# Patient Record
Sex: FEMALE | Race: ASIAN | Employment: UNEMPLOYED | URBAN - METROPOLITAN AREA
[De-identification: names, ages, dates, MRNs, and addresses within clinical notes are randomized per-mention and may not be internally consistent; named-entity substitution may affect disease eponyms.]

---

## 2023-07-25 ENCOUNTER — HOSPITAL ENCOUNTER (OUTPATIENT)
Dept: ULTRASOUND IMAGING | Facility: HOSPITAL | Age: 53
Discharge: HOME | End: 2023-07-25
Attending: ADVANCED PRACTICE MIDWIFE
Payer: MEDICAID

## 2023-07-25 ENCOUNTER — LAB ENCOUNTER (OUTPATIENT)
Dept: LAB | Facility: HOSPITAL | Age: 53
End: 2023-07-25
Attending: ADVANCED PRACTICE MIDWIFE
Payer: MEDICAID

## 2023-07-25 ENCOUNTER — OFFICE VISIT (OUTPATIENT)
Dept: OBGYN CLINIC | Facility: CLINIC | Age: 53
End: 2023-07-25

## 2023-07-25 VITALS
SYSTOLIC BLOOD PRESSURE: 131 MMHG | WEIGHT: 152 LBS | DIASTOLIC BLOOD PRESSURE: 86 MMHG | HEIGHT: 59 IN | HEART RATE: 92 BPM | BODY MASS INDEX: 30.64 KG/M2

## 2023-07-25 DIAGNOSIS — N91.2 AMENORRHEA: ICD-10-CM

## 2023-07-25 DIAGNOSIS — N91.2 AMENORRHEA: Primary | ICD-10-CM

## 2023-07-25 DIAGNOSIS — R23.2 HOT FLASHES: ICD-10-CM

## 2023-07-25 PROBLEM — Z98.51 HISTORY OF BILATERAL TUBAL LIGATION: Status: ACTIVE | Noted: 2023-07-25

## 2023-07-25 LAB
ESTRADIOL SERPL-MCNC: 30.6 PG/ML
FSH SERPL-ACNC: 60.4 MIU/ML
TSI SER-ACNC: 0.95 MIU/ML (ref 0.36–3.74)

## 2023-07-25 PROCEDURE — 76856 US EXAM PELVIC COMPLETE: CPT | Performed by: ADVANCED PRACTICE MIDWIFE

## 2023-07-25 PROCEDURE — 82670 ASSAY OF TOTAL ESTRADIOL: CPT

## 2023-07-25 PROCEDURE — 99203 OFFICE O/P NEW LOW 30 MIN: CPT | Performed by: ADVANCED PRACTICE MIDWIFE

## 2023-07-25 PROCEDURE — 84443 ASSAY THYROID STIM HORMONE: CPT

## 2023-07-25 PROCEDURE — 83001 ASSAY OF GONADOTROPIN (FSH): CPT

## 2023-07-25 PROCEDURE — 36415 COLL VENOUS BLD VENIPUNCTURE: CPT

## 2023-07-25 PROCEDURE — 76830 TRANSVAGINAL US NON-OB: CPT | Performed by: ADVANCED PRACTICE MIDWIFE

## 2023-07-25 PROCEDURE — 3075F SYST BP GE 130 - 139MM HG: CPT | Performed by: ADVANCED PRACTICE MIDWIFE

## 2023-07-25 PROCEDURE — 3008F BODY MASS INDEX DOCD: CPT | Performed by: ADVANCED PRACTICE MIDWIFE

## 2023-07-25 PROCEDURE — 3079F DIAST BP 80-89 MM HG: CPT | Performed by: ADVANCED PRACTICE MIDWIFE

## 2023-08-06 DIAGNOSIS — N83.9 LESION OF LEFT OVARY: Primary | ICD-10-CM

## 2023-08-07 ENCOUNTER — TELEPHONE (OUTPATIENT)
Dept: OBGYN CLINIC | Facility: CLINIC | Age: 53
End: 2023-08-07

## 2023-08-07 NOTE — TELEPHONE ENCOUNTER
: Matthew     Patient given [facility] results and recommendations via . Patient verbalizes understanding.

## 2023-08-07 NOTE — TELEPHONE ENCOUNTER
----- Message from Mary Rg CNM sent at 8/6/2023  6:54 AM CDT -----  Labs c/w likely perimenopause/menopause. Ultrasound with 2 small fibroids. They also saw a bright spot on left ovary of uncertain cause. Recommend f/u ultrasound in 6 months to re-evaluate.  She should keep appt for annual exam. Thanks MAIK

## 2023-08-07 NOTE — TELEPHONE ENCOUNTER
----- Message from Aakash Nathan CNM sent at 8/6/2023  6:54 AM CDT -----  Labs c/w likely perimenopause/menopause. Ultrasound with 2 small fibroids. They also saw a bright spot on left ovary of uncertain cause. Recommend f/u ultrasound in 6 months to re-evaluate.  She should keep appt for annual exam. Thanks MAIK

## 2023-08-07 NOTE — PROGRESS NOTES
Alisson Ng is a 48year old female. HPI:       Alisson Ng is a 48year old female. [id number] Patient's last menstrual period was 2023 (approximate). Patient presents with: Annual: Pt is here for her annual visit and c/o not having her menstrual cycle and in concern   LP:     Visit done today with  line: [id number]    Denies abnormal discharge or vaginal irritation. Last period 23. Monogamous relationship. Feels safe. Denies abuse      Last pap: Never    Mammogram: never    Colonoscopy: No    Family hx of breast or ovarian CA: No      Had pelvic ultrasound for pain and overall WNL however showed a nonspecific ehcogenic focus in left ovary with recommendation to f/u in 6 months. HISTORY:  History reviewed. No pertinent past medical history. Past Surgical History:   Procedure Laterality Date    TUBAL LIGATION        History reviewed. No pertinent family history. Social History:   Social History     Socioeconomic History    Marital status: Single   Tobacco Use    Smoking status: Never    Smokeless tobacco: Never   Vaping Use    Vaping Use: Never used   Substance and Sexual Activity    Alcohol use: Never    Drug use: Never    Sexual activity: Yes      OB History     T4    L5    SAB0  IAB0  Ectopic0  Multiple1  Live Births5     Medications (Active prior to today's visit):  No current outpatient medications on file. Allergies:  No Known Allergies      ROS:     Review of Systems   Constitutional: Negative. Respiratory: Negative. Cardiovascular: Negative. Gastrointestinal: Negative. Genitourinary: Negative. Neurological: Negative. Psychiatric/Behavioral: Negative. PHYSICAL EXAM:      23  1213   BP: 116/81   Pulse: 103     Physical Exam  Vitals reviewed. Constitutional:       General: She is not in acute distress. Appearance: Normal appearance. She is well-developed. She is not ill-appearing.    HENT:      Head: Normocephalic. Neck:      Thyroid: No thyroid mass, thyromegaly or thyroid tenderness. Cardiovascular:      Rate and Rhythm: Normal rate and regular rhythm. Heart sounds: Normal heart sounds. Pulmonary:      Effort: Pulmonary effort is normal.      Breath sounds: Normal breath sounds. Chest:   Breasts:     Breasts are symmetrical.      Right: No swelling, bleeding, inverted nipple, mass, nipple discharge, skin change or tenderness. Left: No swelling, bleeding, inverted nipple, mass, nipple discharge, skin change or tenderness. Abdominal:      Palpations: Abdomen is soft. Tenderness: There is no abdominal tenderness. There is no right CVA tenderness or left CVA tenderness. Genitourinary:     General: Normal vulva. Pubic Area: No rash or pubic lice. Labia:         Right: No rash, tenderness, lesion or injury. Left: No rash, tenderness, lesion or injury. Urethra: No prolapse, urethral pain, urethral swelling or urethral lesion. Vagina: No signs of injury and foreign body. No vaginal discharge, erythema, tenderness, bleeding, lesions or prolapsed vaginal walls. Cervix: No cervical motion tenderness, discharge, friability, lesion, erythema, cervical bleeding or eversion. Musculoskeletal:         General: Normal range of motion. Lymphadenopathy:      Lower Body: No right inguinal adenopathy. No left inguinal adenopathy. Neurological:      Mental Status: She is alert and oriented to person, place, and time. Psychiatric:         Speech: Speech normal.         Behavior: Behavior normal.         Thought Content: Thought content normal.         Judgment: Judgment normal.          ASSESSMENT/PLAN:      Diagnoses and all orders for this visit:    Women's annual routine gynecological examination    Papanicolaou smear for cervical cancer screening    Screening mammogram for breast cancer  -     [facility] SCREENING BILBRITT (CPT=77067);  Future    Diabetes mellitus screening  -     HEMOGLOBIN A1C; Future    Screening cholesterol level  -     LIPID PANEL; Future    Screening for deficiency anemia  -     CBC, PLATELET; NO DIFFERENTIAL; Future    Colon cancer screening  -     OP REFERRAL TO [facility] GI TELEPHONE COLON SCREEN; Future    Screening for cervical cancer  -     THINPREP PAP SMEAR B; Future  -     HPV HIGH RISK , THIN PREP COLLECTION; Future        Normal gyne exam. Pap with HPV to lab      STD testing: Declines    Discussed breast awareness. Mammogram ordered       Referral to GI for colon cancer screening    List of PCP's given. Encouraged to see PCP    Discussed lab & ultrasound results. F/u ultrasound in 6 months. Advised menopausal after 1 yr no menses.      F/u 1 year or prn              8/7/2023  Mary Rg CNM

## 2023-08-08 ENCOUNTER — OFFICE VISIT (OUTPATIENT)
Dept: OBGYN CLINIC | Facility: CLINIC | Age: 53
End: 2023-08-08

## 2023-08-08 VITALS
WEIGHT: 153 LBS | HEART RATE: 103 BPM | DIASTOLIC BLOOD PRESSURE: 81 MMHG | HEIGHT: 59 IN | BODY MASS INDEX: 30.84 KG/M2 | SYSTOLIC BLOOD PRESSURE: 116 MMHG

## 2023-08-08 DIAGNOSIS — Z12.11 COLON CANCER SCREENING: ICD-10-CM

## 2023-08-08 DIAGNOSIS — Z12.31 SCREENING MAMMOGRAM FOR BREAST CANCER: ICD-10-CM

## 2023-08-08 DIAGNOSIS — Z13.220 SCREENING CHOLESTEROL LEVEL: ICD-10-CM

## 2023-08-08 DIAGNOSIS — Z13.0 SCREENING FOR DEFICIENCY ANEMIA: ICD-10-CM

## 2023-08-08 DIAGNOSIS — Z01.419 WOMEN'S ANNUAL ROUTINE GYNECOLOGICAL EXAMINATION: Primary | ICD-10-CM

## 2023-08-08 DIAGNOSIS — Z12.4 SCREENING FOR CERVICAL CANCER: ICD-10-CM

## 2023-08-08 DIAGNOSIS — Z12.4 PAPANICOLAOU SMEAR FOR CERVICAL CANCER SCREENING: ICD-10-CM

## 2023-08-08 DIAGNOSIS — Z13.1 DIABETES MELLITUS SCREENING: ICD-10-CM

## 2023-08-08 PROCEDURE — 3008F BODY MASS INDEX DOCD: CPT | Performed by: ADVANCED PRACTICE MIDWIFE

## 2023-08-08 PROCEDURE — 3079F DIAST BP 80-89 MM HG: CPT | Performed by: ADVANCED PRACTICE MIDWIFE

## 2023-08-08 PROCEDURE — 3074F SYST BP LT 130 MM HG: CPT | Performed by: ADVANCED PRACTICE MIDWIFE

## 2023-08-08 PROCEDURE — 99396 PREV VISIT EST AGE 40-64: CPT | Performed by: ADVANCED PRACTICE MIDWIFE

## 2023-08-09 LAB — HPV I/H RISK 1 DNA SPEC QL NAA+PROBE: NEGATIVE

## 2023-08-23 ENCOUNTER — TELEPHONE (OUTPATIENT)
Dept: OBGYN CLINIC | Facility: CLINIC | Age: 53
End: 2023-08-23

## 2023-08-23 NOTE — TELEPHONE ENCOUNTER
----- Message from Julissa Marquez CNM sent at 8/23/2023 10:12 AM CDT -----  Please notify neg pap and HPV. Next pap due 5 yrs but should still have yearly annual exams.  Thanks MJ